# Patient Record
Sex: FEMALE | Race: WHITE | NOT HISPANIC OR LATINO | Employment: FULL TIME | URBAN - METROPOLITAN AREA
[De-identification: names, ages, dates, MRNs, and addresses within clinical notes are randomized per-mention and may not be internally consistent; named-entity substitution may affect disease eponyms.]

---

## 2019-04-11 ENCOUNTER — OFFICE VISIT (OUTPATIENT)
Dept: OBSTETRICS AND GYNECOLOGY | Facility: CLINIC | Age: 39
End: 2019-04-11
Payer: COMMERCIAL

## 2019-04-11 VITALS
BODY MASS INDEX: 20.97 KG/M2 | DIASTOLIC BLOOD PRESSURE: 62 MMHG | HEIGHT: 69 IN | SYSTOLIC BLOOD PRESSURE: 120 MMHG | WEIGHT: 141.56 LBS

## 2019-04-11 DIAGNOSIS — N93.9 ABNORMAL UTERINE BLEEDING (AUB): Primary | ICD-10-CM

## 2019-04-11 PROCEDURE — 3008F PR BODY MASS INDEX (BMI) DOCUMENTED: ICD-10-PCS | Mod: CPTII,S$GLB,, | Performed by: OBSTETRICS & GYNECOLOGY

## 2019-04-11 PROCEDURE — 99999 PR PBB SHADOW E&M-NEW PATIENT-LVL III: ICD-10-PCS | Mod: PBBFAC,,, | Performed by: OBSTETRICS & GYNECOLOGY

## 2019-04-11 PROCEDURE — 99999 PR PBB SHADOW E&M-NEW PATIENT-LVL III: CPT | Mod: PBBFAC,,, | Performed by: OBSTETRICS & GYNECOLOGY

## 2019-04-11 PROCEDURE — 99203 OFFICE O/P NEW LOW 30 MIN: CPT | Mod: S$GLB,,, | Performed by: OBSTETRICS & GYNECOLOGY

## 2019-04-11 PROCEDURE — 99203 PR OFFICE/OUTPT VISIT, NEW, LEVL III, 30-44 MIN: ICD-10-PCS | Mod: S$GLB,,, | Performed by: OBSTETRICS & GYNECOLOGY

## 2019-04-11 PROCEDURE — 3008F BODY MASS INDEX DOCD: CPT | Mod: CPTII,S$GLB,, | Performed by: OBSTETRICS & GYNECOLOGY

## 2019-04-11 RX ORDER — ESTRADIOL 2 MG/1
2 TABLET ORAL DAILY
Qty: 30 TABLET | Refills: 1 | Status: SHIPPED | OUTPATIENT
Start: 2019-04-11 | End: 2020-04-10

## 2019-04-11 RX ORDER — MEDROXYPROGESTERONE ACETATE 10 MG/1
TABLET ORAL
Refills: 0 | COMMUNITY
Start: 2019-03-18

## 2019-04-11 NOTE — PROGRESS NOTES
Subjective:       Patient ID: Lisa Felix is a 38 y.o. female.    Chief Complaint:  Menometrorrhagia (7 weeks)    History of Present Illness:  HPI  37 y/o  presents for evaluation of prolonged AUB. Reports bleeding for 7 weeks. Bleeding is worse with standing. S/p hysteroscopic myomectomy and hydrothermal ablation 3/2018. Initially oral provera daily decreased bleeding. She then increased the dose to BID which did not improve symptoms. Two weeks ago she had Depo Provera 200 mg with plans to continue monthly. Recent H/h 11.8/36.6She was on OCPs when she was younger but a liver U/S showed a liver ?hematoma and OCPs were stopped. Denies history of migraines with aura, VTE, HTN smoking. States she is very active, rides horses. She is not interested in hysterectomy. She has had an Essure BTL.    GYN & OB History  Patient's last menstrual period was 2019.   Date of Last Pap: No result found    OB History    Para Term  AB Living   1 1 1         SAB TAB Ectopic Multiple Live Births                  # Outcome Date GA Lbr Anil/2nd Weight Sex Delivery Anes PTL Lv   1 Term 2009    M Vag-Spont          Review of Systems  Review of Systems   Constitutional: Negative for chills, diaphoresis, fatigue and fever.   Respiratory: Negative for cough and shortness of breath.    Cardiovascular: Negative for chest pain and palpitations.   Gastrointestinal: Negative for abdominal pain, constipation, diarrhea, nausea and vomiting.   Genitourinary: Positive for menstrual problem and vaginal bleeding. Negative for dysmenorrhea, dyspareunia, dysuria, frequency, pelvic pain, vaginal discharge and vaginal pain.   Musculoskeletal: Negative for back pain and myalgias.   Integumentary:  Negative for rash and acne.   Neurological: Negative for headaches.   Psychiatric/Behavioral: Negative for depression. The patient is not nervous/anxious.            Objective:    Physical Exam:   Constitutional: She is oriented to  person, place, and time. She appears well-developed and well-nourished. No distress.    HENT:   Head: Normocephalic and atraumatic.    Eyes: EOM are normal.    Neck: Normal range of motion.     Pulmonary/Chest: Effort normal.        Abdominal: Soft. She exhibits no distension, no mass and no abdominal incision. There is no tenderness. There is no rebound and no guarding. No hernia.             Musculoskeletal: Normal range of motion and moves all extremeties.       Neurological: She is alert and oriented to person, place, and time.    Skin: Skin is warm. No rash noted.    Psychiatric: She has a normal mood and affect. Her behavior is normal. Judgment and thought content normal.          Assessment:        1. Abnormal uterine bleeding (AUB)            Plan:      Lisa was seen today for menometrorrhagia.    Diagnoses and all orders for this visit:    Abnormal uterine bleeding (AUB)  - Counseled patient on causes of AUB. Recommended U/S to evaluate for structural causes, though did discuss limitations of U/S and possible need for further workup with SIS or hysteroscopy/D&C.   - AUB may be worsened by progestin use (Depo Provera) and may benefit from short course of estrogen therapy. Patient with ? History of liver mass/hematoma in her early 20s while on OCPs. Counseled patient that estrogen use may affect liver and she expressed understanding. She would like to proceed with trial of estrogen therapy. She would like to avoid surgery at this time. Alternative use of progestin only LARCs discussed with patient, she declined at this time.  -     US Pelvis Complete Non OB; Future  -     estradiol (ESTRACE) 2 MG tablet; Take 1 tablet (2 mg total) by mouth once daily.    Orders Placed This Encounter   Procedures    US Pelvis Complete Non OB       Follow up if symptoms worsen or fail to improve.

## 2019-04-24 ENCOUNTER — TELEPHONE (OUTPATIENT)
Dept: OBSTETRICS AND GYNECOLOGY | Facility: CLINIC | Age: 39
End: 2019-04-24

## 2019-04-24 NOTE — TELEPHONE ENCOUNTER
----- Message from Leslie Hewitt sent at 4/24/2019  3:06 PM CDT -----  Contact: pt  Name of Who is Calling: SERGIO BROWN [[id number]]      What is the request in detail: pt calling in regards to passing blood cloths and medication not working.. Please advise      Can the clinic reply by [facility]: no      [facility]: [phone]

## 2019-04-24 NOTE — TELEPHONE ENCOUNTER
Returned patient's call. Patient states that she is bleeding heavily with passage of clots and having severe cramping. She was seen by me on 4/11/19 and started on estrace due to concern of AUB on Depo Provera. She states that she started estrace the following day but after about a week was concerend it was causing a headache so did not take the pill for one day (4/20/19). She realized the headache may be from allergies and started taking zyrtec which has resolved her headache. She restarted the estrace on 4/21/19 and has now started having heavy bleeding. Given that it does not seem like her headache is related to estrogen, recommended to continue estrace at this time. Discussed a taper briefly but patient was unable to talk long. She will let me know how bleeding is going tomorrow morning. Precautions reviewed.

## 2019-04-24 NOTE — TELEPHONE ENCOUNTER
Spoke with Whitney, pt stated since she started the new medication she has been having headache and bleeding cloths. Pt stated she is filling a pad an hour. Pt denied fever,burred vision. Pt advised to go to the ed. Pt stated she would like a call back from Dr Grigsby. Informed pt that a message will be sent to . Pt verbalized understanding.

## 2019-04-26 ENCOUNTER — TELEPHONE (OUTPATIENT)
Dept: OBSTETRICS AND GYNECOLOGY | Facility: CLINIC | Age: 39
End: 2019-04-26

## 2019-04-26 NOTE — TELEPHONE ENCOUNTER
----- Message from Rea Bond sent at 4/26/2019  8:19 AM CDT -----  Contact: Patient   Patient called to inform provider of continuous heavy bleeding and clotting. The patient can be reached at [phone].

## 2019-05-21 ENCOUNTER — TELEPHONE (OUTPATIENT)
Dept: OBSTETRICS AND GYNECOLOGY | Facility: CLINIC | Age: 39
End: 2019-05-21

## 2019-05-21 NOTE — TELEPHONE ENCOUNTER
Returned patient's call. States that she had a U/S in [place] that showed a 2 cm fibroid in the wall of the uterus and a separate fibroid unknown location. She states no bleeding or spotting for the past few days. Continuing to take estrace daily. Patient not desiring surgery currently, but would consider if bleeding increased. Would want to wait until the end of the year. Precautions reviewed and all questions answered.

## 2019-05-21 NOTE — TELEPHONE ENCOUNTER
----- Message from Treva Oakley MA sent at 5/20/2019  4:11 PM CDT -----  Pt stated she had an U/S done in [place]. Pt stated that she has a 2 centimeter fibroid. Pt stated she's not having any bleeding at this time but would like to discuss surgery option.  ----- Message -----  From: Stu López  Sent: 5/20/2019  11:48 AM  To: [facility] Staff    PLEASE CALL PT NO DETAILS [phone]